# Patient Record
Sex: MALE | Race: BLACK OR AFRICAN AMERICAN | NOT HISPANIC OR LATINO | Employment: PART TIME | URBAN - METROPOLITAN AREA
[De-identification: names, ages, dates, MRNs, and addresses within clinical notes are randomized per-mention and may not be internally consistent; named-entity substitution may affect disease eponyms.]

---

## 2017-11-08 ENCOUNTER — HOSPITAL ENCOUNTER (EMERGENCY)
Facility: HOSPITAL | Age: 37
End: 2017-11-09
Attending: EMERGENCY MEDICINE | Admitting: EMERGENCY MEDICINE
Payer: COMMERCIAL

## 2017-11-08 DIAGNOSIS — IMO0002 SELF-INFLICTED INJURY: ICD-10-CM

## 2017-11-08 DIAGNOSIS — R45.1 AGITATION: Primary | ICD-10-CM

## 2017-11-08 DIAGNOSIS — R45.851 SUICIDAL IDEATIONS: ICD-10-CM

## 2017-11-08 DIAGNOSIS — S41.119A LACERATION OF ARM: ICD-10-CM

## 2017-11-08 DIAGNOSIS — F32.A DEPRESSION: ICD-10-CM

## 2017-11-08 DIAGNOSIS — R46.89 AGGRESSIVENESS: ICD-10-CM

## 2017-11-08 PROCEDURE — 82075 ASSAY OF BREATH ETHANOL: CPT | Performed by: EMERGENCY MEDICINE

## 2017-11-08 RX ORDER — HALOPERIDOL 5 MG/ML
5 INJECTION INTRAMUSCULAR ONCE
Status: COMPLETED | OUTPATIENT
Start: 2017-11-09 | End: 2017-11-09

## 2017-11-08 RX ORDER — LORAZEPAM 2 MG/ML
2 INJECTION INTRAMUSCULAR ONCE
Status: COMPLETED | OUTPATIENT
Start: 2017-11-09 | End: 2017-11-09

## 2017-11-08 RX ORDER — BENZTROPINE MESYLATE 1 MG/ML
1 INJECTION INTRAMUSCULAR; INTRAVENOUS ONCE
Status: COMPLETED | OUTPATIENT
Start: 2017-11-09 | End: 2017-11-09

## 2017-11-09 ENCOUNTER — HOSPITAL ENCOUNTER (INPATIENT)
Facility: HOSPITAL | Age: 37
LOS: 6 days | Discharge: HOME | DRG: 753 | End: 2017-11-15
Attending: PSYCHIATRY & NEUROLOGY | Admitting: PSYCHIATRY & NEUROLOGY
Payer: COMMERCIAL

## 2017-11-09 VITALS
TEMPERATURE: 98.7 F | OXYGEN SATURATION: 98 % | DIASTOLIC BLOOD PRESSURE: 62 MMHG | WEIGHT: 169.75 LBS | HEART RATE: 72 BPM | SYSTOLIC BLOOD PRESSURE: 114 MMHG | RESPIRATION RATE: 16 BRPM

## 2017-11-09 DIAGNOSIS — F31.64 BIPOLAR I DISORDER, MOST RECENT EPISODE MIXED, SEVERE WITH PSYCHOTIC FEATURES (HCC): Primary | Chronic | ICD-10-CM

## 2017-11-09 LAB
AMPHETAMINES SERPL QL SCN: NEGATIVE
BARBITURATES UR QL: NEGATIVE
BENZODIAZ UR QL: NEGATIVE
COCAINE UR QL: NEGATIVE
ETHANOL EXG-MCNC: 0.01 MG/DL
ETHANOL EXG-MCNC: 0.16 MG/DL
METHADONE UR QL: NEGATIVE
OPIATES UR QL SCN: NEGATIVE
PCP UR QL: NEGATIVE
THC UR QL: POSITIVE

## 2017-11-09 PROCEDURE — 90715 TDAP VACCINE 7 YRS/> IM: CPT | Performed by: EMERGENCY MEDICINE

## 2017-11-09 PROCEDURE — 99285 EMERGENCY DEPT VISIT HI MDM: CPT

## 2017-11-09 PROCEDURE — 80307 DRUG TEST PRSMV CHEM ANLYZR: CPT | Performed by: EMERGENCY MEDICINE

## 2017-11-09 PROCEDURE — 96372 THER/PROPH/DIAG INJ SC/IM: CPT

## 2017-11-09 PROCEDURE — 82075 ASSAY OF BREATH ETHANOL: CPT | Performed by: EMERGENCY MEDICINE

## 2017-11-09 PROCEDURE — 90471 IMMUNIZATION ADMIN: CPT

## 2017-11-09 RX ORDER — ACETAMINOPHEN 325 MG/1
650 TABLET ORAL EVERY 6 HOURS PRN
Status: DISCONTINUED | OUTPATIENT
Start: 2017-11-09 | End: 2017-11-15 | Stop reason: HOSPADM

## 2017-11-09 RX ORDER — BENZTROPINE MESYLATE 1 MG/ML
1 INJECTION INTRAMUSCULAR; INTRAVENOUS EVERY 6 HOURS PRN
Status: CANCELLED | OUTPATIENT
Start: 2017-11-09

## 2017-11-09 RX ORDER — OLANZAPINE 10 MG/1
10 TABLET ORAL
Status: DISCONTINUED | OUTPATIENT
Start: 2017-11-09 | End: 2017-11-15 | Stop reason: HOSPADM

## 2017-11-09 RX ORDER — HALOPERIDOL 5 MG
5 TABLET ORAL EVERY 8 HOURS PRN
Status: DISCONTINUED | OUTPATIENT
Start: 2017-11-09 | End: 2017-11-15 | Stop reason: HOSPADM

## 2017-11-09 RX ORDER — OLANZAPINE 10 MG/1
10 TABLET ORAL
Status: CANCELLED | OUTPATIENT
Start: 2017-11-09

## 2017-11-09 RX ORDER — HALOPERIDOL 5 MG/ML
5 INJECTION INTRAMUSCULAR EVERY 6 HOURS PRN
Status: DISCONTINUED | OUTPATIENT
Start: 2017-11-09 | End: 2017-11-15 | Stop reason: HOSPADM

## 2017-11-09 RX ORDER — MAGNESIUM HYDROXIDE/ALUMINUM HYDROXICE/SIMETHICONE 120; 1200; 1200 MG/30ML; MG/30ML; MG/30ML
30 SUSPENSION ORAL EVERY 4 HOURS PRN
Status: DISCONTINUED | OUTPATIENT
Start: 2017-11-09 | End: 2017-11-15 | Stop reason: HOSPADM

## 2017-11-09 RX ORDER — IBUPROFEN 600 MG/1
600 TABLET ORAL EVERY 8 HOURS PRN
Status: CANCELLED | OUTPATIENT
Start: 2017-11-09

## 2017-11-09 RX ORDER — BENZTROPINE MESYLATE 1 MG/ML
1 INJECTION INTRAMUSCULAR; INTRAVENOUS EVERY 6 HOURS PRN
Status: DISCONTINUED | OUTPATIENT
Start: 2017-11-09 | End: 2017-11-15 | Stop reason: HOSPADM

## 2017-11-09 RX ORDER — ZOLPIDEM TARTRATE 5 MG/1
5 TABLET ORAL
Status: CANCELLED | OUTPATIENT
Start: 2017-11-09

## 2017-11-09 RX ORDER — HYDROXYZINE HYDROCHLORIDE 25 MG/1
25 TABLET, FILM COATED ORAL EVERY 6 HOURS PRN
Status: DISCONTINUED | OUTPATIENT
Start: 2017-11-09 | End: 2017-11-15 | Stop reason: HOSPADM

## 2017-11-09 RX ORDER — HALOPERIDOL 5 MG/ML
5 INJECTION INTRAMUSCULAR EVERY 6 HOURS PRN
Status: CANCELLED | OUTPATIENT
Start: 2017-11-09

## 2017-11-09 RX ORDER — RISPERIDONE 1 MG/1
1 TABLET, ORALLY DISINTEGRATING ORAL EVERY 4 HOURS PRN
Status: DISCONTINUED | OUTPATIENT
Start: 2017-11-09 | End: 2017-11-15 | Stop reason: HOSPADM

## 2017-11-09 RX ORDER — OLANZAPINE 10 MG/1
10 INJECTION, POWDER, LYOPHILIZED, FOR SOLUTION INTRAMUSCULAR
Status: DISCONTINUED | OUTPATIENT
Start: 2017-11-09 | End: 2017-11-15 | Stop reason: HOSPADM

## 2017-11-09 RX ORDER — IBUPROFEN 600 MG/1
600 TABLET ORAL EVERY 8 HOURS PRN
Status: DISCONTINUED | OUTPATIENT
Start: 2017-11-09 | End: 2017-11-15 | Stop reason: HOSPADM

## 2017-11-09 RX ORDER — LORAZEPAM 2 MG/ML
1 INJECTION INTRAMUSCULAR EVERY 6 HOURS PRN
Status: CANCELLED | OUTPATIENT
Start: 2017-11-09

## 2017-11-09 RX ORDER — ACETAMINOPHEN 325 MG/1
650 TABLET ORAL EVERY 6 HOURS PRN
Status: CANCELLED | OUTPATIENT
Start: 2017-11-09

## 2017-11-09 RX ORDER — LORAZEPAM 1 MG/1
1 TABLET ORAL EVERY 8 HOURS PRN
Status: DISCONTINUED | OUTPATIENT
Start: 2017-11-09 | End: 2017-11-10

## 2017-11-09 RX ORDER — LORAZEPAM 1 MG/1
1 TABLET ORAL EVERY 8 HOURS PRN
Status: CANCELLED | OUTPATIENT
Start: 2017-11-09

## 2017-11-09 RX ORDER — HYDROXYZINE HYDROCHLORIDE 25 MG/1
25 TABLET, FILM COATED ORAL EVERY 6 HOURS PRN
Status: CANCELLED | OUTPATIENT
Start: 2017-11-09

## 2017-11-09 RX ORDER — MAGNESIUM HYDROXIDE/ALUMINUM HYDROXICE/SIMETHICONE 120; 1200; 1200 MG/30ML; MG/30ML; MG/30ML
30 SUSPENSION ORAL EVERY 4 HOURS PRN
Status: CANCELLED | OUTPATIENT
Start: 2017-11-09

## 2017-11-09 RX ORDER — RISPERIDONE 1 MG/1
1 TABLET, ORALLY DISINTEGRATING ORAL EVERY 4 HOURS PRN
Status: CANCELLED | OUTPATIENT
Start: 2017-11-09

## 2017-11-09 RX ORDER — LORAZEPAM 2 MG/ML
1 INJECTION INTRAMUSCULAR EVERY 6 HOURS PRN
Status: DISCONTINUED | OUTPATIENT
Start: 2017-11-09 | End: 2017-11-10

## 2017-11-09 RX ORDER — BENZTROPINE MESYLATE 1 MG/1
1 TABLET ORAL EVERY 6 HOURS PRN
Status: DISCONTINUED | OUTPATIENT
Start: 2017-11-09 | End: 2017-11-15 | Stop reason: HOSPADM

## 2017-11-09 RX ORDER — BENZTROPINE MESYLATE 0.5 MG/1
1 TABLET ORAL EVERY 6 HOURS PRN
Status: CANCELLED | OUTPATIENT
Start: 2017-11-09

## 2017-11-09 RX ORDER — ZOLPIDEM TARTRATE 5 MG/1
5 TABLET ORAL
Status: DISCONTINUED | OUTPATIENT
Start: 2017-11-09 | End: 2017-11-15 | Stop reason: HOSPADM

## 2017-11-09 RX ORDER — HALOPERIDOL 5 MG
5 TABLET ORAL EVERY 8 HOURS PRN
Status: CANCELLED | OUTPATIENT
Start: 2017-11-09

## 2017-11-09 RX ORDER — TRAMADOL HYDROCHLORIDE 50 MG/1
50 TABLET ORAL EVERY 6 HOURS PRN
Status: DISCONTINUED | OUTPATIENT
Start: 2017-11-09 | End: 2017-11-15 | Stop reason: HOSPADM

## 2017-11-09 RX ORDER — TRAMADOL HYDROCHLORIDE 50 MG/1
50 TABLET ORAL EVERY 6 HOURS PRN
Status: CANCELLED | OUTPATIENT
Start: 2017-11-09

## 2017-11-09 RX ORDER — KETAMINE HYDROCHLORIDE 50 MG/ML
150 INJECTION, SOLUTION, CONCENTRATE INTRAMUSCULAR; INTRAVENOUS ONCE
Status: COMPLETED | OUTPATIENT
Start: 2017-11-09 | End: 2017-11-09

## 2017-11-09 RX ORDER — OLANZAPINE 10 MG/1
10 INJECTION, POWDER, LYOPHILIZED, FOR SOLUTION INTRAMUSCULAR
Status: CANCELLED | OUTPATIENT
Start: 2017-11-09

## 2017-11-09 RX ADMIN — LORAZEPAM 2 MG: 2 INJECTION INTRAMUSCULAR; INTRAVENOUS at 00:03

## 2017-11-09 RX ADMIN — KETAMINE HYDROCHLORIDE 150 MG: 50 INJECTION, SOLUTION INTRAMUSCULAR; INTRAVENOUS at 00:32

## 2017-11-09 RX ADMIN — HALOPERIDOL LACTATE 5 MG: 5 INJECTION, SOLUTION INTRAMUSCULAR at 00:03

## 2017-11-09 RX ADMIN — TETANUS TOXOID, REDUCED DIPHTHERIA TOXOID AND ACELLULAR PERTUSSIS VACCINE, ADSORBED 0.5 ML: 5; 2.5; 8; 8; 2.5 SUSPENSION INTRAMUSCULAR at 00:35

## 2017-11-09 RX ADMIN — BENZTROPINE MESYLATE 1 MG: 1 INJECTION INTRAMUSCULAR; INTRAVENOUS at 00:03

## 2017-11-09 NOTE — ED NOTES
Pt thrashing around in bed after administration of medications  Pt attempting to rock stretcher and pull extremities out of restraints  Pt alternates between yelling obscenities at staff and laughing uncontrollably  Pt then stops thrashing and makes eye contact with this nurse stating "I told you  I will get out of here  I will find you and kill you  I have memorized all your faces and you will be mine  This is not a joke, so don't even think of laughing  I will kill you  Then I will kill your family, I know you have kids " Pt then continued to make death threats to other staff members       Jr Rosa RN  11/09/17 9078
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
## 2017-11-09 NOTE — ED NOTES
Security at bedside for straight cath along with Toya Marin  Patient had to be held down for catheter when he began getting irritated with insertion  Patient back to sleep once procedure was completed        Sri Avina RN  11/09/17 0384

## 2017-11-09 NOTE — ED NOTES
Patient taken out of locked restraints at this time security at bedside  Patient remains calm and cooperative  Patient informed if he becomes verbally or physically aggressive with staff  locked restrains would be reapplied  Patient verbalized understanding and asked for a blanket   Patient remains on continuous visual monitoring     Anthony Venegas RN  11/09/17 1229

## 2017-11-09 NOTE — ED NOTES
Pt screaming in room, thrashing in bed, pulling at restraints  Pt could be heard rocking the stretcher back and forth  Before staff could return room pt flipped stretcher over into wall  Dr Ruthann Montanez aware         Karen Mike, STEVEN  11/09/17 2000

## 2017-11-09 NOTE — ED NOTES
Patient awake and alert at this time  Patient is cooperative with nursing staff  Locked restraint removed from left arm and right leg at this time  Patient informed that if he becomes aggressive with staff both physically and verbally that all four restraints would be reapplied  Patient agreeable   Patient provided with urinal per request       Jamie Alvarado RN  11/09/17 5135

## 2017-11-09 NOTE — ED NOTES
Assumed care of patient at this time  Patient sleeping, respirations equal and unlabored  Appears in no apparent distress  Will continue to monitor via continuous video monitoring        Jai Montez RN  11/09/17 4566

## 2017-11-09 NOTE — ED NOTES
Pt not cooperative at this time; Dr Lazaro Cee at bedside to see pt      Monse Ramey, STEVEN  11/09/17 0013

## 2017-11-09 NOTE — ED NOTES
Patient remains calm and cooperative, meal tray provided to patient      Lila Artis RN  11/09/17 8408

## 2017-11-09 NOTE — ED NOTES
Pt uncooperative during triage and assessment of vitals  Pt refusing to change out of clothing or use breathalyzer  Pt states "I am getting the fuck out of here  I don't need any of this stuff" Explained to pt that in order for that to happen and to expedite the process an alcohol level needs to be documented  Pt then states "You have no idea who I am or what I am capable of  When I get out of here, I am going to find you and kill you " Again pt was asked to blow into BAT  Pt again states to this nurse "I don't have to do anything  I am coming after you  As soon as I get out of here, I will find you and kill you  I don't want you in my room   So leave" Dr Wesley Desai at bedside at this time     Radha Moreland, [facility]  11/09/17 8533

## 2017-11-09 NOTE — ED NOTES
Monae from [address] [place] [address] called and indicated they do not have a bed available for the patient

## 2017-11-09 NOTE — EMTALA/ACUTE CARE TRANSFER
[facility]  [address] [place] [address]  Dept: [phone]      EMTALA TRANSFER CONSENT    NAME Juan Crawford                                         1980                              MRN [id number]    I have been informed of my rights regarding examination, treatment, and transfer   by Dr Shazia Rodriguez*    Benefits: Continuity of care    Risks: Potential for delay in receiving treatment      Consent for Transfer:  I acknowledge that my medical condition has been evaluated and explained to me by the emergency department physician or other qualified medical person and/or my attending physician, who has recommended that I be transferred to the service of  Accepting Physician: Dr Bernita Kanner (Simultaneous filing  User may not have seen previous data ) at [address] [place] [address] Rd Name, [address] : [address] (Simultaneous filing  User may not have seen previous data  )  The above potential benefits of such transfer, the potential risks associated with such transfer, and the probable risks of not being transferred have been explained to me, and I fully understand them  The doctor has explained that, in my case, the benefits of transfer outweigh the risks  I agree to be transferred  I authorize the performance of emergency medical procedures and treatments upon me in both transit and upon arrival at the receiving facility  Additionally, I authorize the release of any and all medical records to the receiving facility and request they be transported with me, if possible  I understand that the safest mode of transportation during a medical emergency is an ambulance and that the Hospital advocates the use of this mode of transport   Risks of traveling to the receiving facility by car, including absence of medical control, life sustaining equipment, such as oxygen, and medical personnel has been explained to me and I fully understand them     ([facility])  [  ]  I consent to the stated transfer and to be transported by ambulance/helicopter  [  ]  I consent to the stated transfer, but refuse transportation by ambulance and accept full responsibility for my transportation by car  I understand the risks of non-ambulance transfers and I exonerate the Hospital and its staff from any deterioration in my condition that results from this refusal     X___________________________________________    DATE  17  TIME________  Signature of patient or legally responsible individual signing on patient behalf           RELATIONSHIP TO PATIENT_________________________          Provider Certification    NAME Theoplis Glenwood                                         1980                              MRN [id number]    A medical screening exam was performed on the above named patient  Based on the examination:    Condition Necessitating Transfer There were no encounter diagnoses  Patient Condition: The patient has been stabilized such that within reasonable medical probability, no material deterioration of the patient condition or the condition of the unborn child(ary) is likely to result from the transfer    Reason for Transfer: Level of Care needed not available at this facility    Transfer Requirements: [address] (Simultaneous filing  User may not have seen previous data )   · Space available and qualified personnel available for treatment as acknowledged by Naveen France [phone]  · Agreed to accept transfer and to provide appropriate medical treatment as acknowledged by       Dr Greardo Reid (Simultaneous filing   User may not have seen previous data )  · Appropriate medical records of the examination and treatment of the patient are provided at the time of transfer   [address] _______  · Transfer will be performed by qualified personnel from [facility]  and appropriate transfer equipment as required, including the use of necessary and appropriate life support measures  Provider Certification: I have examined the patient and explained the following risks and benefits of being transferred/refusing transfer to the patient/family:  General risk, such as traffic hazards, adverse weather conditions, rough terrain or turbulence, possible failure of equipment (including vehicle or aircraft), or consequences of actions of persons outside the control of the transport personnel, The patient is stable for psychiatric transfer because they are medically stable, and is protected from harming him/herself or others during transport      Based on these reasonable risks and benefits to the patient and/or the unborn child(ary), and based upon the information available at the time of the patients examination, I certify that the medical benefits reasonably to be expected from the provision of appropriate medical treatments at another medical facility outweigh the increasing risks, if any, to the individuals medical condition, and in the case of labor to the unborn child, from effecting the transfer      X____________________________________________ DATE 11/09/17        TIME_______      ORIGINAL - SEND TO MEDICAL RECORDS   COPY - SEND WITH PATIENT DURING TRANSFER

## 2017-11-09 NOTE — ED NOTES
Per Patient request, significant other Zaida Mullen to take belongings home      Carlos Lea RN  11/09/17 7116

## 2017-11-09 NOTE — ED NOTES
Pt less verbally aggressive at this time;  Pt becoming sleepy             Jose M Alvarenga RN  11/09/17 7326

## 2017-11-09 NOTE — ED NOTES
2nd attempt this morning to speak with patient  Patient offers one word answer and falls back to sleep, difficult for him to stay awake  Patient, at this time, can not hold conversation adequate to make decisions re: treatment

## 2017-11-09 NOTE — ED NOTES
Pt changed in paper scrubs at this time; security and multiple Rn's at bedside to complete this task       Trevon Hummel RN  11/09/17 2590

## 2017-11-09 NOTE — ED NOTES
Assumed care of pt at this time; pt is in restraints resting comfortable in bed; respirations relaxed and un labored       Virgilio Velez RN  11/09/17 1083

## 2017-11-09 NOTE — ED NOTES
After administration of medications patient remains uncooperative and verbally aggressive toward staff at bedside for patient safety and staff safety  Patient while sitting upright in bed continues to rock stretcher and attempt to pull extremities out of restraints  Verbally aggressive comments with obscenities interlaced become quiet and patient starts laughing in a deep tone of voice  Patient makes direct eye contact with specific nurse in room and states, "I told you  I will get out of here  I will find you and kill you  I have memorized all your faces and you will be mine  This is not a joke, so don't even think of laughing  I will kill you  Then I will kill your family, I know you have kids " After completing death threat toward nurse turned toward this nurse and stated "You too nigga, when I get out of here, I will find you and kill you " "I don't care about anyone else in this room except them, I will find them and kill them " Two RN's stepped out of room to speak with [employer] officers and to attempt to de-escalate the situation due to direct death threats made toward specific nurses             Michelle Valdez, STEVEN  11/09/17 4614

## 2017-11-09 NOTE — ED NOTES
Pt looked around room at staff members and stated "When you go home to your cars, to your families, I will be there"      Kaye Archibald RN  11/09/17 0755

## 2017-11-10 PROBLEM — F39 MOOD DISORDER (HCC): Status: ACTIVE | Noted: 2017-11-10

## 2017-11-10 PROBLEM — F10.239 ALCOHOL DEPENDENCE WITH WITHDRAWAL (HCC): Status: ACTIVE | Noted: 2017-11-10

## 2017-11-10 LAB
ALBUMIN SERPL BCP-MCNC: 3.4 G/DL (ref 3.5–5)
ALP SERPL-CCNC: 72 U/L (ref 46–116)
ALT SERPL W P-5'-P-CCNC: 29 U/L (ref 12–78)
ANION GAP SERPL CALCULATED.3IONS-SCNC: 7 MMOL/L (ref 4–13)
AST SERPL W P-5'-P-CCNC: 45 U/L (ref 5–45)
BASOPHILS # BLD AUTO: 0.03 THOUSANDS/ΜL (ref 0–0.1)
BASOPHILS NFR BLD AUTO: 1 % (ref 0–1)
BILIRUB SERPL-MCNC: 1.53 MG/DL (ref 0.2–1)
BUN SERPL-MCNC: 16 MG/DL (ref 5–25)
CALCIUM SERPL-MCNC: 8.8 MG/DL (ref 8.3–10.1)
CHLORIDE SERPL-SCNC: 105 MMOL/L (ref 100–108)
CHOLEST SERPL-MCNC: 198 MG/DL (ref 50–200)
CO2 SERPL-SCNC: 27 MMOL/L (ref 21–32)
CREAT SERPL-MCNC: 1.08 MG/DL (ref 0.6–1.3)
EOSINOPHIL # BLD AUTO: 0.22 THOUSAND/ΜL (ref 0–0.61)
EOSINOPHIL NFR BLD AUTO: 4 % (ref 0–6)
ERYTHROCYTE [DISTWIDTH] IN BLOOD BY AUTOMATED COUNT: 12.8 % (ref 11.6–15.1)
GFR SERPL CREATININE-BSD FRML MDRD: 102 ML/MIN/1.73SQ M
GLUCOSE P FAST SERPL-MCNC: 76 MG/DL (ref 65–99)
GLUCOSE SERPL-MCNC: 76 MG/DL (ref 65–140)
HCT VFR BLD AUTO: 43.4 % (ref 36.5–49.3)
HDLC SERPL-MCNC: 90 MG/DL (ref 40–60)
HGB BLD-MCNC: 14.6 G/DL (ref 12–17)
LDLC SERPL CALC-MCNC: 81 MG/DL (ref 0–100)
LYMPHOCYTES # BLD AUTO: 2.02 THOUSANDS/ΜL (ref 0.6–4.47)
LYMPHOCYTES NFR BLD AUTO: 36 % (ref 14–44)
MAGNESIUM SERPL-MCNC: 2.3 MG/DL (ref 1.6–2.6)
MCH RBC QN AUTO: 27.3 PG (ref 26.8–34.3)
MCHC RBC AUTO-ENTMCNC: 33.6 G/DL (ref 31.4–37.4)
MCV RBC AUTO: 81 FL (ref 82–98)
MONOCYTES # BLD AUTO: 0.58 THOUSAND/ΜL (ref 0.17–1.22)
MONOCYTES NFR BLD AUTO: 10 % (ref 4–12)
NEUTROPHILS # BLD AUTO: 2.8 THOUSANDS/ΜL (ref 1.85–7.62)
NEUTS SEG NFR BLD AUTO: 49 % (ref 43–75)
NRBC BLD AUTO-RTO: 0 /100 WBCS
PLATELET # BLD AUTO: 158 THOUSANDS/UL (ref 149–390)
PMV BLD AUTO: 12.6 FL (ref 8.9–12.7)
POTASSIUM SERPL-SCNC: 3.7 MMOL/L (ref 3.5–5.3)
PROT SERPL-MCNC: 7.1 G/DL (ref 6.4–8.2)
RBC # BLD AUTO: 5.34 MILLION/UL (ref 3.88–5.62)
SODIUM SERPL-SCNC: 139 MMOL/L (ref 136–145)
TRIGL SERPL-MCNC: 136 MG/DL
TSH SERPL DL<=0.05 MIU/L-ACNC: 0.8 UIU/ML (ref 0.36–3.74)
WBC # BLD AUTO: 5.65 THOUSAND/UL (ref 4.31–10.16)

## 2017-11-10 PROCEDURE — 80053 COMPREHEN METABOLIC PANEL: CPT | Performed by: PSYCHIATRY & NEUROLOGY

## 2017-11-10 PROCEDURE — 83735 ASSAY OF MAGNESIUM: CPT | Performed by: PSYCHIATRY & NEUROLOGY

## 2017-11-10 PROCEDURE — 80061 LIPID PANEL: CPT | Performed by: PSYCHIATRY & NEUROLOGY

## 2017-11-10 PROCEDURE — 85025 COMPLETE CBC W/AUTO DIFF WBC: CPT | Performed by: PSYCHIATRY & NEUROLOGY

## 2017-11-10 PROCEDURE — 86592 SYPHILIS TEST NON-TREP QUAL: CPT | Performed by: PSYCHIATRY & NEUROLOGY

## 2017-11-10 PROCEDURE — 84443 ASSAY THYROID STIM HORMONE: CPT | Performed by: PSYCHIATRY & NEUROLOGY

## 2017-11-10 RX ORDER — LORAZEPAM 2 MG/ML
2 INJECTION INTRAMUSCULAR EVERY 6 HOURS PRN
Status: DISCONTINUED | OUTPATIENT
Start: 2017-11-10 | End: 2017-11-15 | Stop reason: HOSPADM

## 2017-11-10 RX ORDER — GABAPENTIN 400 MG/1
400 CAPSULE ORAL 3 TIMES DAILY
Status: DISCONTINUED | OUTPATIENT
Start: 2017-11-10 | End: 2017-11-15 | Stop reason: HOSPADM

## 2017-11-10 RX ORDER — LORAZEPAM 1 MG/1
2 TABLET ORAL EVERY 8 HOURS PRN
Status: DISCONTINUED | OUTPATIENT
Start: 2017-11-10 | End: 2017-11-15 | Stop reason: HOSPADM

## 2017-11-10 RX ADMIN — GABAPENTIN 400 MG: 400 CAPSULE ORAL at 21:36

## 2017-11-10 RX ADMIN — Medication 1 TABLET: at 13:56

## 2017-11-10 RX ADMIN — GABAPENTIN 400 MG: 400 CAPSULE ORAL at 10:57

## 2017-11-10 RX ADMIN — GABAPENTIN 400 MG: 400 CAPSULE ORAL at 17:00
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
## 2017-11-10 NOTE — DISCHARGE INSTR - OTHER ORDERS
[address]  [phone]    Walk in hours are at the above address on Monday, Tuesday, and Thursday 9-11:30am; Wednesday from 9-11:30am and 1-3pm     [facility]  [address]  [phone]    Please go to your appointment on Thursday, 11/16/17, at 2pm with your treatment team       [facility]  [address] [facility] [address]  [place], [address] [facility]    Please go to the above address if any medical concerns or conditions arise  Need Help Now? If you or someone you care about is having a problem with drugs and/or alcohol, there is help:     There is a [place] [facility] Office that you can call 8:00 a m  till 4:00 p m  to 15 [phone] or email Dora@google com  org, 24 hours a day, that can help walk you through your options for getting help  If you do not have health insurance and are in financial need, this office             may also be able help you in accessing funding for services   If you have health insurance, including medical assistance, there should be a phone number on your insurance card that you can all to find out how to access services  The card may say, For Behavioral Services or For Drug and Alcohol Services or For Substance Abuse Services call the number provided   If it is after regular hours or holiday and it is a medical emergency, you should seek help at your local hospital emergency room  The [facility] and Alcohol Office will help work with the hospitals to access treatment upon discharge   Local support groups, such as Alcohol Anonymous, Narcotic Anonymous, Al-Anon (for family members) exist in many locations and can be accessed free of charge and without any membership commitment  You can learn about what meetings exist in your area email at https://[employer]/  aspx or calling the listing in your local phonebook  These meetings also in the community section of the local newspaper   If you still need help, you can call the [place] Department of Drug and Alcohol Programs at [phone]
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
## 2017-11-10 NOTE — PROGRESS NOTES
Patient visible in the milieu  No agitation  Does appear paranoid/suspicious  Denies AH but does have an inappropriate smile during interaction  States that the reason he cut himself was in a suicide attempt but denies SI currently  Compliant with medications  Will continue to monitor

## 2017-11-10 NOTE — CASE MANAGEMENT
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  Reports history of depression for the past three years, IP in 2014 for psychiatric reasons in [place], [place], does not remember the name of the hospital  Lives with wife, Delaney Lam; his 3year old lives with the child's mother  States he felt suicidal yesterday and did cut self, with no intent to die present  Denies recent or current HI at this time  Denies psychosis, yet does seem preoccupied, possibly responding to internal stimuli  Patient signed treatment plan and ROIs for wife, PCP, and OP mental health treatment  Agrees to see outpatient therapist and psychiatrist upon discharge  CM will continue to monitor
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
## 2017-11-11 RX ADMIN — Medication 1 TABLET: at 08:34

## 2017-11-11 RX ADMIN — GABAPENTIN 400 MG: 400 CAPSULE ORAL at 08:34

## 2017-11-11 RX ADMIN — GABAPENTIN 400 MG: 400 CAPSULE ORAL at 15:42

## 2017-11-11 RX ADMIN — GABAPENTIN 400 MG: 400 CAPSULE ORAL at 21:03

## 2017-11-11 NOTE — TREATMENT PLAN
TREATMENT PLAN REVIEW - [id number] [facility], [place] Funk 40 y o  1980 male MRN: [id number]    [address] [facility] Room / Bed: Brian Portillo [facility] Encounter: [id number]          Admit Date/Time:  11/9/2017  7:46 PM    Treatment Team: Attending Provider: Lexi Hilario MD; : Stephie Brown; Patient Care Technician: Gregg Francisco; Patient Care Technician: Laura Sagastume;  Registered Nurse: Sunday Hansen, RN; Patient Care Technician: Julia Daley; Registered Nurse: Oz Owens RN    Diagnosis: Principal Problem:    Mood disorder [facility])  Active Problems:    Alcohol dependence with withdrawal [facility])    Patient Strengths: negotiates basic needs, patient is on a voluntary commitment, stable/recent employment, supportive family, work skills     Patient Barriers: difficulty adapting, lack of social/family support, limited education, limited family ties, limited motivation, patient is unwilling to work on problems, poor insight, poor interpersonal skills, substance abuse    Short Term Goals: decrease in depressive symptoms, decrease in manic symptoms, decrease in anxiety symptoms, decrease in suicidal thoughts, decrease in level of agitation, control of withdrawal symptoms, ability to stay safe on the unit, ability to stay free from restraints    Long Term Goals: improvement in depression, improvement in anxiety, resolution of manic symptoms, stabilization of mood, free of suicidal thoughts, no self abusive behavior, resolution of withdrawal symptoms, improved reality testing, improved reasoning ability, improved impulse control, improved insight    Progress Towards Goals: starting psychitric medications as prescribed, starting alcohol detoxification protocol    Recommended Treatment: medication management, patient medication education, group therapy, milieu therapy, continued Behavioral Health psychiatric evaluation/assessment process Treatment Frequency: daily medication monitoring, group and milieu therapy daily, monitoring through interdisciplinary rounds, monitoring through weekly patient care conferences    Expected Discharge Date:  3-4 days    Discharge Plan: attend AA meetings, referral for outpatient drug and alcohol counseling, referral for outpatient medication management with a psychiatrist, referral for outpatient psychotherapy, referrals as indicated, return to previous living arrangement    Treatment Plan Created/Updated By: Leandro Lorenzo MD

## 2017-11-11 NOTE — PROGRESS NOTES
C/O" I am funny , having good time, think about work "    Report from staff regarding this patient received and record reviewed  prior to seeing this patient   Behavior over the last 24 hours:    Sleep:lNot too good  Appetite:Ok  Medication side effects:denied  ROS:still elevated  mood and  Elevated mood   Mental Status Evaluation:  Appearance:  Dressed appropraitely   Behavior:  cooperative   Speech:  pressured   Mood:  elevated   Affect:  appropriate  b   Thought Process:  disorganed   Thought Content:  grandosed   Perceptual Disturbances: Denied AV hallucination   Risk Potential: NO REFUGIO    Sensorium:  normal   Cognition:  intact   Consciousness:  Alert, OX3   Attention: Fair   Insight:  poor   Judgment: poor   Gait/Station: With in normal range   Motor Activity: With in normal range     Progress Toward Goals: working on current treatment goals, no changes  Made in treatment plan   Recommended Treatment: Continue with group therapy, milieu therapy and occupational therapy  Risks, benefits and possible side effects of Medications:   Risks, benefits, and possible side effects of medications explained to patient and patient verbalizes understanding        Medications:   current meds:   Current Facility-Administered Medications   Medication Dose Route Frequency    acetaminophen (TYLENOL) tablet 650 mg  650 mg Oral Q6H PRN    aluminum-magnesium hydroxide-simethicone (MYLANTA) 200-200-20 mg/5 mL oral suspension 30 mL  30 mL Oral Q4H PRN    benztropine (COGENTIN) injection 1 mg  1 mg Intramuscular Q6H PRN    benztropine (COGENTIN) tablet 1 mg  1 mg Oral Q6H PRN    gabapentin (NEURONTIN) capsule 400 mg  400 mg Oral TID    haloperidol (HALDOL) tablet 5 mg  5 mg Oral Q8H PRN    haloperidol lactate (HALDOL) injection 5 mg  5 mg Intramuscular Q6H PRN    hydrOXYzine HCL (ATARAX) tablet 25 mg  25 mg Oral Q6H PRN    ibuprofen (MOTRIN) tablet 600 mg  600 mg Oral Q8H PRN    LORazepam (ATIVAN) 2 mg/mL injection 2 mg  2 mg Intramuscular Q6H PRN    LORazepam (ATIVAN) tablet 2 mg  2 mg Oral Q8H PRN    magnesium hydroxide (MILK OF MAGNESIA) 400 mg/5 mL oral suspension 30 mL  30 mL Oral Daily PRN    multivitamin-minerals (CENTRUM) tablet 1 tablet  1 tablet Oral Daily    nicotine polacrilex (NICORETTE) gum 2 mg  2 mg Oral Q2H PRN    OLANZapine (ZyPREXA) IM injection 10 mg  10 mg Intramuscular Q3H PRN    OLANZapine (ZyPREXA) tablet 10 mg  10 mg Oral Q3H PRN    risperiDONE (RisperDAL M-TABS) dispersible tablet 1 mg  1 mg Oral Q4H PRN    traMADol (ULTRAM) tablet 50 mg  50 mg Oral Q6H PRN    zolpidem (AMBIEN) tablet 5 mg  5 mg Oral HS PRN     Labs: NA    Assessment, Diagnosis  and Plan: continue with current meds and goals, F/U tomorrow    Counseling / Coordination of Care  Total floor / unit time spent today20 minutes  minutes  Greater than 50% of total time was spent with the patient and / or family counseling and / or coordination of care  A description of the counseling / coordination of care:      Dre Brian MD

## 2017-11-11 NOTE — PROGRESS NOTES
Patients wife asked to speak to this nurse with patient  She wanted to know how the patient was doing and if he could go home tomorrow  She was told that patient has been very cooperative and participating in his care  She was upset because she feels he should be able to go home  She was told that he may go home early next week as patients are generally not discharged over the weekend  Patient said he understood and was ok with this
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
## 2017-11-11 NOTE — PLAN OF CARE
Problem: SELF HARM/SUICIDALITY  Goal: Will have no self-injury during hospital stay  INTERVENTIONS:  - Q 15 MINUTES: Routine safety checks  - Q WAKING SHIFT & PRN: Assess risk to determine if routine checks are adequate to maintain patient safety  - Encourage patient to participate actively in care by formulating a plan to combat response to suicidal ideation, identify supports and resources   Outcome: Progressing      Problem: DEPRESSION  Goal: Will be euthymic at discharge  INTERVENTIONS:  - Administer medication as ordered  - Provide emotional support via 1:1 interaction with staff  - Encourage involvement in milieu/groups/activities  - Monitor for social isolation   Outcome: Progressing      Problem: PSYCHOSIS  Goal: Will report no hallucinations or delusions  Interventions:  - Administer medication as  ordered  - Every waking shifts and PRN assess for the presence of hallucinations and or delusions  - Assist with reality testing to support increasing orientation  - Assess if patient's hallucinations or delusions are encouraging self-harm or harm to others and intervene as appropriate   Outcome: Progressing      Problem: ANXIETY  Goal: Will report anxiety at manageable levels  INTERVENTIONS:  - Administer medication as ordered  - Teach and encourage coping skills  - Provide emotional support  - Assess patient/family for anxiety and ability to cope   Outcome: Not Progressing    Goal: By discharge: Patient will verbalize 2 strategies to deal with anxiety  Interventions:  - Identify any obvious source/trigger to anxiety  - Staff will assist patient in applying identified coping technique/skills  - Encourage attendance of scheduled groups and activities   Outcome: Not Progressing      Problem: SUBSTANCE USE/ABUSE  Goal: Will have no detox symptoms and will verbalize plan for changing substance-related behavior  INTERVENTIONS:  - Monitor physical status and assess for symptoms of withdrawal  - Administer medication as ordered  - Provide emotional support with 1 on 1 interaction with staff  - Encourage recovery focused program/ addiction education  - Assess for verbalization of changing behaviors related to substance abuse  - Initiate consults and referrals as appropriate (Case Management, Spiritual Care, etc )   Outcome: Progressing    Goal: By discharge, will develop insight into their chemical dependency and sustain motivation to continue in recovery  INTERVENTIONS:  - Attends all daily group sessions and scheduled AA groups  - Actively practices coping skills through participation in the therapeutic community and adherence to program rules  - Reviews and completes assignments from individual treatment plan  - Assist patient development of understanding of their personal cycle of addiction and relapse triggers   Outcome: Not Progressing    Goal: By discharge, patient will have ongoing treatment plan addressing chemical dependency  INTERVENTIONS:  - Assist patient with resources and/or appointments for ongoing recovery based living   Outcome: Progressing      Problem: DISCHARGE PLANNING  Goal: Discharge to home or other facility with appropriate resources  INTERVENTIONS:  - Identify barriers to discharge w/patient and caregiver  - Arrange for needed discharge resources and transportation as appropriate  - Identify discharge learning needs (meds, wound care, etc )  - Arrange for interpretive services to assist at discharge as needed  - Refer to Case Management Department for coordinating discharge planning if the patient needs post-hospital services based on physician/advanced practitioner order or complex needs related to functional status, cognitive ability, or social support system   Outcome: Progressing      Problem: Ineffective Coping  Goal: Cooperates with admission process  Interventions:   - Complete admission process   Outcome: Progressing    Goal: Identifies ineffective coping skills  Outcome: Progressing    Goal: Patient/Family participate in treatment and DC plans  Interventions:  - Provide therapeutic environment   Outcome: Progressing

## 2017-11-12 RX ORDER — RISPERIDONE 0.25 MG/1
0.5 TABLET, FILM COATED ORAL 2 TIMES DAILY
Status: DISCONTINUED | OUTPATIENT
Start: 2017-11-12 | End: 2017-11-13

## 2017-11-12 RX ORDER — DIVALPROEX SODIUM 500 MG/1
500 TABLET, EXTENDED RELEASE ORAL
Status: DISCONTINUED | OUTPATIENT
Start: 2017-11-12 | End: 2017-11-13

## 2017-11-12 RX ADMIN — GABAPENTIN 400 MG: 400 CAPSULE ORAL at 09:02

## 2017-11-12 RX ADMIN — RISPERIDONE 0.5 MG: 0.25 TABLET ORAL at 11:54

## 2017-11-12 RX ADMIN — RISPERIDONE 0.5 MG: 0.25 TABLET ORAL at 17:10

## 2017-11-12 RX ADMIN — GABAPENTIN 400 MG: 400 CAPSULE ORAL at 17:10

## 2017-11-12 RX ADMIN — DIVALPROEX SODIUM 500 MG: 500 TABLET, EXTENDED RELEASE ORAL at 21:17

## 2017-11-12 RX ADMIN — Medication 1 TABLET: at 09:02

## 2017-11-12 RX ADMIN — GABAPENTIN 400 MG: 400 CAPSULE ORAL at 21:17
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
## 2017-11-12 NOTE — PLAN OF CARE
Problem: SELF HARM/SUICIDALITY  Goal: Will have no self-injury during hospital stay  INTERVENTIONS:  - Q 15 MINUTES: Routine safety checks  - Q WAKING SHIFT & PRN: Assess risk to determine if routine checks are adequate to maintain patient safety  - Encourage patient to participate actively in care by formulating a plan to combat response to suicidal ideation, identify supports and resources   Outcome: Progressing      Problem: DEPRESSION  Goal: Will be euthymic at discharge  INTERVENTIONS:  - Administer medication as ordered  - Provide emotional support via 1:1 interaction with staff  - Encourage involvement in milieu/groups/activities  - Monitor for social isolation   Outcome: Progressing      Problem: PSYCHOSIS  Goal: Will report no hallucinations or delusions  Interventions:  - Administer medication as  ordered  - Every waking shifts and PRN assess for the presence of hallucinations and or delusions  - Assist with reality testing to support increasing orientation  - Assess if patient's hallucinations or delusions are encouraging self-harm or harm to others and intervene as appropriate   Outcome: Progressing      Problem: ANXIETY  Goal: Will report anxiety at manageable levels  INTERVENTIONS:  - Administer medication as ordered  - Teach and encourage coping skills  - Provide emotional support  - Assess patient/family for anxiety and ability to cope   Outcome: Progressing    Goal: By discharge: Patient will verbalize 2 strategies to deal with anxiety  Interventions:  - Identify any obvious source/trigger to anxiety  - Staff will assist patient in applying identified coping technique/skills  - Encourage attendance of scheduled groups and activities   Outcome: Progressing      Problem: SUBSTANCE USE/ABUSE  Goal: Will have no detox symptoms and will verbalize plan for changing substance-related behavior  INTERVENTIONS:  - Monitor physical status and assess for symptoms of withdrawal  - Administer medication as ordered  - Provide emotional support with 1 on 1 interaction with staff  - Encourage recovery focused program/ addiction education  - Assess for verbalization of changing behaviors related to substance abuse  - Initiate consults and referrals as appropriate (Case Management, Spiritual Care, etc )   Outcome: Progressing    Goal: By discharge, will develop insight into their chemical dependency and sustain motivation to continue in recovery  INTERVENTIONS:  - Attends all daily group sessions and scheduled AA groups  - Actively practices coping skills through participation in the therapeutic community and adherence to program rules  - Reviews and completes assignments from individual treatment plan  - Assist patient development of understanding of their personal cycle of addiction and relapse triggers   Outcome: Progressing    Goal: By discharge, patient will have ongoing treatment plan addressing chemical dependency  INTERVENTIONS:  - Assist patient with resources and/or appointments for ongoing recovery based living   Outcome: Progressing      Problem: DISCHARGE PLANNING  Goal: Discharge to home or other facility with appropriate resources  INTERVENTIONS:  - Identify barriers to discharge w/patient and caregiver  - Arrange for needed discharge resources and transportation as appropriate  - Identify discharge learning needs (meds, wound care, etc )  - Arrange for interpretive services to assist at discharge as needed  - Refer to Case Management Department for coordinating discharge planning if the patient needs post-hospital services based on physician/advanced practitioner order or complex needs related to functional status, cognitive ability, or social support system   Outcome: Progressing      Problem: Ineffective Coping  Goal: Cooperates with admission process  Interventions:   - Complete admission process   Outcome: Progressing    Goal: Identifies ineffective coping skills  Outcome: Progressing    Goal: Patient/Family participate in treatment and DC plans  Interventions:  - Provide therapeutic environment   Outcome: Progressing

## 2017-11-12 NOTE — PROGRESS NOTES
Patient was very cooperative this am   We talked about his visit with his wife yesterday and he said it went tell and that she was coming in to visit him today again    Less pressured and smiling today    No si or hi

## 2017-11-13 PROBLEM — F31.64 BIPOLAR I DISORDER, MOST RECENT EPISODE MIXED, SEVERE WITH PSYCHOTIC FEATURES (HCC): Chronic | Status: ACTIVE | Noted: 2017-11-10

## 2017-11-13 LAB — RPR SER QL: NORMAL

## 2017-11-13 RX ORDER — DIVALPROEX SODIUM 500 MG/1
1000 TABLET, EXTENDED RELEASE ORAL
Status: DISCONTINUED | OUTPATIENT
Start: 2017-11-13 | End: 2017-11-15 | Stop reason: HOSPADM

## 2017-11-13 RX ORDER — RISPERIDONE 1 MG/1
1 TABLET, FILM COATED ORAL 2 TIMES DAILY
Status: DISCONTINUED | OUTPATIENT
Start: 2017-11-13 | End: 2017-11-14

## 2017-11-13 RX ADMIN — GABAPENTIN 400 MG: 400 CAPSULE ORAL at 17:57

## 2017-11-13 RX ADMIN — RISPERIDONE 0.5 MG: 0.25 TABLET ORAL at 08:23

## 2017-11-13 RX ADMIN — LORAZEPAM 2 MG: 1 TABLET ORAL at 13:35

## 2017-11-13 RX ADMIN — RISPERIDONE 1 MG: 1 TABLET ORAL at 17:59

## 2017-11-13 RX ADMIN — DIVALPROEX SODIUM 1000 MG: 500 TABLET, FILM COATED, EXTENDED RELEASE ORAL at 21:25

## 2017-11-13 RX ADMIN — GABAPENTIN 400 MG: 400 CAPSULE ORAL at 08:23

## 2017-11-13 RX ADMIN — Medication 1 TABLET: at 08:24

## 2017-11-13 RX ADMIN — GABAPENTIN 400 MG: 400 CAPSULE ORAL at 21:26

## 2017-11-13 NOTE — CASE MANAGEMENT
CM met with patient  Patient upset due to confusion with discharge, as he believes he was told it was today for date  CM reviewed goals of inpatient treatment with patient, medication management goals, and goals of discharge  Patient remained calm and seemed to understand the need for each of these  Patient remains dysthymic due to wishing to help his significant other, who is sick at home  He is taking responsibility for his actions and sees the need for continued treatment upon discharge  This CM spoke with significant other, who is requesting discharge be as soon as possible due to her medical condition  Will call her, with patient, later today, to update
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
## 2017-11-13 NOTE — PLAN OF CARE
Problem: Ineffective Coping  Goal: Participates in unit activities  Outcome: Progressing  Participated in Nursing Ed and Late Afternoon Life skills [employer]

## 2017-11-13 NOTE — PLAN OF CARE
Problem: Ineffective Coping  Goal: Cooperates with admission process  Interventions:   - Complete admission process   Outcome: Completed Date Met: 11/13/17

## 2017-11-13 NOTE — PROGRESS NOTES
Patient very social out in milieu  Has been cooperative and had not been inappropriate this evening   Denies SI

## 2017-11-13 NOTE — PROGRESS NOTES
Patient upset; crying and agitated after finding out that he will not be discharged today  States that on Friday the dr told him that if he had a good weekend he would be discharged today  Patient feels that the dr lied to him  Reported that his wife is dying and he needs to be there for her   Patient then went on to tell RN that [facility] will [facility]

## 2017-11-14 LAB
BILIRUB UR QL STRIP: NEGATIVE
CLARITY UR: ABNORMAL
COLOR UR: YELLOW
GLUCOSE UR STRIP-MCNC: NEGATIVE MG/DL
HGB UR QL STRIP.AUTO: NEGATIVE
KETONES UR STRIP-MCNC: ABNORMAL MG/DL
LEUKOCYTE ESTERASE UR QL STRIP: NEGATIVE
NITRITE UR QL STRIP: NEGATIVE
PH UR STRIP.AUTO: 7.5 [PH] (ref 4.5–8)
PROT UR STRIP-MCNC: NEGATIVE MG/DL
SP GR UR STRIP.AUTO: 1.03 (ref 1–1.03)
UROBILINOGEN UR QL STRIP.AUTO: 0.2 E.U./DL

## 2017-11-14 PROCEDURE — 81003 URINALYSIS AUTO W/O SCOPE: CPT | Performed by: PSYCHIATRY & NEUROLOGY

## 2017-11-14 RX ORDER — RISPERIDONE 1 MG/1
2 TABLET, FILM COATED ORAL EVERY EVENING
Status: DISCONTINUED | OUTPATIENT
Start: 2017-11-14 | End: 2017-11-15 | Stop reason: HOSPADM

## 2017-11-14 RX ORDER — RISPERIDONE 1 MG/1
1 TABLET, FILM COATED ORAL DAILY
Status: DISCONTINUED | OUTPATIENT
Start: 2017-11-15 | End: 2017-11-15 | Stop reason: HOSPADM

## 2017-11-14 RX ADMIN — Medication 1 TABLET: at 08:28

## 2017-11-14 RX ADMIN — RISPERIDONE 1 MG: 1 TABLET ORAL at 08:28

## 2017-11-14 RX ADMIN — GABAPENTIN 400 MG: 400 CAPSULE ORAL at 18:38

## 2017-11-14 RX ADMIN — GABAPENTIN 400 MG: 400 CAPSULE ORAL at 21:36

## 2017-11-14 RX ADMIN — DIVALPROEX SODIUM 1000 MG: 500 TABLET, FILM COATED, EXTENDED RELEASE ORAL at 21:35

## 2017-11-14 RX ADMIN — GABAPENTIN 400 MG: 400 CAPSULE ORAL at 08:28

## 2017-11-14 RX ADMIN — RISPERIDONE 2 MG: 1 TABLET ORAL at 18:38

## 2017-11-14 NOTE — PROGRESS NOTES
Patient about the unit  Pleasant and cooperative  Compliant with treatment and is hopeful for discharge tomorrow

## 2017-11-14 NOTE — DISCHARGE SUMMARY
Discharge Summary - [address] [facility] 40 y o  male MRN: [id number]  Unit/Bed#: LS2 876-42 Encounter: [id number]        Admission Date: 11/9/2017         Discharge Date: 11/15/2017    Attending Psychiatrist:  Jayde De Santiago MD    Reason for Admission/HPI:   History of Present Illness     Ramakrishna Sagastume is a 38y/o male with h/o a prior psychiatric hospitalization, who was brought to the [facility] ER 41/7/5080 by police after being found by a relative attempting suicide by cutting his wrists  In ER Pt admitted to such, also to being high due to having drank ETOH  He was very agitated, made numerous homicidal threats in general and to staff, extending the threats to staff's families  Restraints were required at one point, along with one prn dose each, of Haldol, Ativan and Cogentin  At some points he would pretend to be affected by the medicine then thrash about  Ketamine 150mg IM was then given  After Pt calmed, restraints were removed  In ER Pt's ETOH breath test: 0 156 and urine drug screen + for THC  After medical clearance Pt was transferred to the [facility] psychiatric unit at the [facility] on a 201 voluntary commitment basis        As per Dr Tapan Conley initial psychiatric note:    ' [Chief Complaint: "I thought I was losing my mind", "I don't want to go on living like this", depression, suicidal ideation, suicide attempt, suicidal gesture, self-abusive behavior and violent behavior     Ramakrishna Sagastume is a 40 y o  male with a history of depression versus bipolar disorder, alcohol use and personality disorder who was admitted to the inpatient psychiatric unit on a voluntary 201 commitment basis due to depression, anxiety, mixed symptoms of bipolar disorder, aggressive behavior and signs and symptoms of alcohol abuse       Symptoms prior to admission included worsening depression, depression, feeling depressed, suicidal behavior, hopelessness, helplessness, poor concentration, anger outbursts, difficulty controlling anger, agitation, alcohol abuse and difficulty attending to activities of daily living  Onset of symptoms was abrupt starting few days ago with rapidly worsening course since that time  Stressors preceding admission included alcohol use problems and job stress       On initial evaluation after admission to the inpatient psychiatric unit the patient was tense and guarded  The patient provided limited information about himself, but he stated that he became more recently depressed and he started to thinking about cutting himself or even superficially cut himself because he believes that his wife and his children would would be better off without him  He did not specify any stressors but he stated he started to drink more heavily  He endorsed symptoms of alcohol abuse was loss of control and mood instability associated was alcohol  He described blackouts but no seizures or DTs  He stated that he has been depressed for a while and he depression is not always associated was alcohol drinking  He denied previous suicidal attempt  He has problems with authorities, and often get angry when someone tried to be boss of him  He had some retirement time and does not afraid of retirement, does not afraid of fight  He does not initiate fight but respond to unjustice  Denied history of seizure Denied any significant head trauma  When we talked about his past psychiatric history the patient was a rather poor historian  He denied suicidal attempts     Denied symptoms of psychosis     Before admission to inpatient psych unit the patient was angry and violent and threatening to staff in the emergency room  He explained this to the writer that he did not like to be told what to do and he did not like to being confined placed that lead to his severe anxiety    During his evaluation in the unit the patient was not angry, not aggressive, had relatively good eye contact and had no threatening behavior  However he was rather tense and guarded] [facility]: On the unit, superficial cuts were observed on his Lt arm by staff and he admitted to auditory hallucinations to medical staff but denied it to the psychiatrist   He at first denied prior psychiatric hx to the psychiatrist, but then later admitted to other staff about h/o depression for at least three years, with a 2014 psychiatric admission in [place]  Neurontin 400mg tid was started to treat potential acute alcohol withdrawal and anxiety symptoms  Zolpidem 5mg qhs was added prn insomnia---which Pt never used  He was guarded and could be agitated at times,  He once cursed staff member because he did not like the food but then apologized  Due to hyper and intrusive behavior, he was started on Depakote 500mg qhs and Risperdal 1mg bid  He was perseverative on discharge citing the need to get home to his wife whom he said was gravely ill and his job  When he could not go home the day he anticipated, he became very angry, threatening, upset and crying and security had to be called to de-escalate him  He was spoken to and given Lorazepam which were successful in calming him down  Depakote ER was titrated to 1000mg qhs and Risperdal titrated to 3mg total per day  There were no physical outbursts for the length of his stay and he denied SI or HI on the unit  He was generally cooperative on the unit and medication compliant  There were no complaints of withdrawal or ETOH cravings during his stay  He was visible and social in the milieu  With medication adjustments Pt's mood improved and he demonstrated self control  On day of discharge, Pt reported feeling "Regular, ready to go home "  He denied depression, anxiety, SI, HI, psychotic Sxs or ETOH cravings  He refuses addiction Tx for ETOH use  He requested Nicorette gum for smoking cessation  Note:  Labwork reviewed and this AM:  ANC 1 56,  VPA 49, and CMP WNL    Pt voicing no medical complaints  Mental Status Evaluation:  Appearance:  Dressed, clean, good eye contact   Behavior:  Calm, cooperative, pleasant   Speech:  Clear, normal rate and volume   Mood:  Euthymic   Affect:  Appropriately broad   Thought Process:  Organized, Goal directed   Associations: intact associations   Thought Content:  No verbalized delusions   Perceptual Disturbances: Pt denies any hallucinations or paranoia and does not appear to be responding to internal stimuli   Risk Potential: Pt presently denies SI or HI    Sensorium:  Person, Place, Day of the week, Month, Year   Memory:  short term memory grossly intact   Consciousness:  alert, awake   Attention: Good   Insight:  Fair   Judgment: Fair   Gait/Station: Normal gait/station   Motor Activity: No abnormal movements     Vitals:    11/14/17 1543   BP: 132/70   Pulse: 79   Resp: 16   Temp: 97 9 °F (36 6 °C)       Discharge Diagnosis:   Bipolar I Disorder, most recent episode mixed, severe with psychotic features  Alcohol dependence with withdrawal         Discharge Medications:  See after visit summary for reconciled discharge medications provided to patient and family  Discharge instructions/Information to patient and family:   See after visit summary for information provided to patient and family  Provisions for Follow-Up Care:  See after visit summary for information related to follow-up care and any pertinent home health orders  Discharge Statement   I spent 45 minutes discharging the patient  This time was spent on the day of discharge  I had direct contact with the patient on the day of discharge  Discussed discharge plan including that Pt must repeat ANC and Divalproex level tomorrow (Rx for these were given)  Divalproex and Risperidone are culprits in this--his first drop in ANC--and we will trend the value  Pt set up for psychiatric intake at [facility] tomorrow    Team will fax them instructions that this labwork is being trended

## 2017-11-14 NOTE — PROGRESS NOTES
Progress Note - Behavioral Health   Claudette Mcnair 40 y o  male MRN: [id number]  Unit/Bed#: SY0 752-75 Encounter: [id number]    Abdulkadir was seen for continuing care and reviewed with treatment team   Pt was pleasant through interview, reported "I'm alright" and denied any mood, anxiety, or psychotic Sxs  He  verbalized his desire for discharge and accepted when I told him the team would need to observe him for one more day  Floor team relayed that he has been calm and cooperative on the unit  Per team, his wife has already arranged for an intake appt for psychiatrist and psychotherapist at [facility] in 2 days  No aggressive outbursts reported  Sleep: Good  Appetite: Good  Medication side effects:  None per Pt  ROS: No complaints    Labs/Tests:  Reviewed    Mental Status Evaluation:     Appearance:  Dressed, Fair hygiene, Fair eye contact   Behavior:  Calm, cooperative, pleasant, with a guarded edge   Speech:  Clear, normal rate and volume   Mood:  Euthymic   Affect:  Appropriately broad   Thought Process:  Organized, Goal directed   Associations: intact associations   Thought Content:  No verbalized delusions   Perceptual Disturbances: Pt denies any hallucinations or paranoia and does not appear to be responding to internal stimuli   Risk Potential: Pt presently denies SI or HI    Sensorium:  Person, Place, Day of the week, Month, Year   Memory:  short term memory grossly intact   Consciousness:  alert, awake   Attention: Good   Insight:  Improving   Judgment: Improving   Gait/Station: Normal gait/station   Motor Activity: No abnormal movements     Vitals:    11/14/17 1543   BP: 132/70   Pulse: 79   Resp: 16   Temp: 97 9 °F (36 6 °C)       Progress Toward Goals:  Gradual progress is being made  Risperdal hs dose increased to 2mg and he will continue 1mg qAM    If Pt remains stable and in control through this night, will discharge tomorrow      Repeat VPA level, CBC and CMP tomorrow    Assessment/Plan   Principal Problem:    Bipolar I disorder, most recent episode mixed, severe with psychotic features ([facility] )  Active Problems:    Al[facility] dependence with withdrawal ([facility] )      Recommended Treatment: Continue with pharmacotherapy, group therapy, milieu therapy and occupational therapy  The patient will be maintained on the following medications:    divalproex sodium 1,000 mg Oral HS   gabapentin 400 mg Oral TID   multivitamin-minerals 1 tablet Oral Daily   [START ON 11/15/2017] risperiDONE 1 mg Oral Daily   risperiDONE 2 mg Oral QPM       Risks, benefits and possible side effects of Medications:   Risks, benefits, and possible side effects of medications explained to patient and patient verbalizes understanding

## 2017-11-14 NOTE — CASE MANAGEMENT
CM met with patient  Patient was calm, cooperative, and appreciative that he may be leaving tomorrow  States that he is able to take a bus if he is medically cleared tomorrow to go home  Patient denies SI, HI, danger to self or others, denies psychosis  CM will continue to monitor

## 2017-11-14 NOTE — DISCHARGE INSTRUCTIONS
Bipolar Disorder   WHAT YOU NEED TO KNOW:   Bipolar disorder is a long-term chemical imbalance that causes rapid changes in mood and behavior  High moods are called suhas  Low moods are called depression  Sometimes you will feel manic and sometimes you will feel depressed  You can have alternating episodes of suhas and depression  This is called a mixed bipolar state  DISCHARGE INSTRUCTIONS:   Call 911 if:   · You think about hurting yourself or someone else  Contact your healthcare provider or psychiatrist if:   · You are having trouble managing your bipolar disorder  · You cannot sleep, or are sleeping all the time  · You cannot eat, or are eating more than usual     · You feel dizzy or your stomach is upset  · You cannot make it to your next meeting  · You have questions or concerns about your condition or care  Medicines:   · Medicines  may be given to help keep your mood stable, or to help you sleep  Changes in medicine are often needed as your bipolar disorder changes  · Take your medicine as directed  Contact your healthcare provider if you think your medicine is not helping or if you have side effects  Tell him or her if you are allergic to any medicine  Keep a list of the medicines, vitamins, and herbs you take  Include the amounts, and when and why you take them  Bring the list or the pill bottles to follow-up visits  Carry your medicine list with you in case of an emergency  Follow up with your healthcare provider or psychiatrist as directed:  Write down your questions so you remember to ask them during your visits  Manage bipolar disorder:  Watch for triggers of bipolar disorder symptoms, such as stress  Learn new ways to relax, such as deep breathing, to manage your stress  Tell someone if you feel a manic or depressive period might be coming on  Ask a friend or family member to help watch you for bipolar symptoms   Work to develop skills that will help you manage bipolar disorder  You may need to make lifestyle changes  Ask your healthcare provider or psychiatrist for resources  For support and more information:   · [address] [facility]), [address] [facility], [address] [facility], [address]  [place] [address]   Phone: [phone]  Phone: [phone]  Web Address: Aimee tn  · Depression and [address] (DBSA)  [address] [place] [address], [facility] , [address]  Phone: [phone]  Web Address: "[employer]" no  org   © 2017 [address] [facility] Information is for End User's use only and may not be sold, redistributed or otherwise used for commercial purposes  All illustrations and images included in CareNotes® are the copyrighted property of A D A M , Inc  or [address]  The above information is an  only  It is not intended as medical advice for individual conditions or treatments  Talk to your doctor, nurse or pharmacist before following any medical regimen to see if it is safe and effective for you  Abuse of Alcohol   WHAT YOU NEED TO KNOW:   · Alcohol abuse is unhealthy drinking behavior  You may drink too much at one time once a week, or continue to drink too much daily  You continue to drink even though it causes problems  The problems can be alcohol related legal problems, or problems with work or relationships with family  · If you drink too much at one time, you are binge drinking  Binge drinking is when you have a large amount of alcohol in a short time  Your blood alcohol concentrations (YOSI) goes above 0 08 g/dLlevel during binge drinking  For men, this usually happens with more than 4 drinks in 2 hours  For women, it is more than 3 drinks in 2 hours  A drink is 12 ounces of beer, 4 ounces of wine, or 1½ ounces of liquor    DISCHARGE INSTRUCTIONS:   Call 911 for the following:   · You have sudden chest pain or trouble breathing  · You have a seizure or have shaking or trembling  · You were in an accident because of alcohol  Seek care immediately if:   · You want to harm yourself or others  · You have hallucinations (you see or hear things that are not real)  · You cannot stop vomiting or you vomit blood  Contact your healthcare provider if:   · You need help to stop drinking alcohol  · You have questions or concerns about your condition or care  Medicines:   · Vitamin supplements  may be given to treat low vitamin levels  Alcohol can make it hard for your body to absorb enough vitamins such as B1  Vitamin supplements may also be given to prevent alcohol related brain damage  · Take your medicine as directed  Contact your healthcare provider if you think your medicine is not helping or if you have side effects  Tell him or her if you are allergic to any medicine  Keep a list of the medicines, vitamins, and herbs you take  Include the amounts, and when and why you take them  Bring the list or the pill bottles to follow-up visits  Carry your medicine list with you in case of an emergency  Treatments or therapies you may need:   · Detoxification (detox) and withdrawal  is a program that helps you to safely get alcohol out of your body  Detox can also help get rid of the physical need to drink  Healthcare providers monitor the physical symptoms of withdrawal  They may give you medicines to help decrease nausea, dehydration, and seizures  Healthcare providers will also monitor your blood pressure, heart and breathing rates, and your temperature  Symptoms of anxiety, depression, and suicidal thoughts are also monitored and managed during detox  Healthcare providers may give you medicines for these symptoms and therapy sessions will be available to you   Detox is usually done at a detox center or in a hospital  Healthcare providers do not recommend that you try to detox at home or by yourself  Withdrawal symptoms may become life-threatening  The center can help you find 12 step programs or an individual therapist to help with emotional support after detox  · Inpatient and outpatient treatment  focus on your personal needs to help you stop drinking  Treatment helps you understand the reasons you abuse alcohol  Counselors and therapists provide you with support and help you find ways to cope instead of drinking  You may need inpatient treatment to provide a controlled environment  You may need outpatient treatment after your inpatient treatment is complete  · Alcohol aversion therapy  takes away the desire to drink by causing a negative reaction when you drink  Healthcare providers may give you medicines that cause nausea and vomiting when you drink alcohol  They may instead give you a medicine that decreases your urge to drink alcohol  These medicines are used to help you stop drinking or reduce the amount you drink  They can also help you avoid relapse  Follow up with your healthcare provider as directed:  Write down your questions so you remember to ask them during your visits  Avoid alcohol:  You should stop drinking entirely  Alcohol can damage your brain, heart, and liver  It also increases your risk for injury, high blood pressure, and certain types of cancer  Alcohol is dangerous when you combine it with certain medicines  Do not drive if you drink alcohol:  Make sure someone who has not been drinking can help you get home  Get support:  Most people need support to stop drinking alcohol  Mental health providers, support groups, rehabilitation centers, and your healthcare provider can provide support     For more information:   · Alcoholics Anonymous  Web Address: http://www cardenas info/  · Substance Abuse and [facility] [address] [place] [phone]  Web Address: https://Qwiqq/  © 2017 [address] Blake Marques Information is for End User's use only and may not be sold, redistributed or otherwise used for commercial purposes  All illustrations and images included in CareNotes® are the copyrighted property of A D A M , Inc  or Cy Danielle  The above information is an  only  It is not intended as medical advice for individual conditions or treatments  Talk to your doctor, nurse or pharmacist before following any medical regimen to see if it is safe and effective for you  Divalproex (By mouth)   Divalproex Sodium (dye-CARMEN-proe-ex BRADEN-sidney-um)  Treats seizures  Also treats bipolar disorder and helps prevent migraine headaches  Brand Name(s): Depakote, Depakote ER, Depakote Sprinkles   There may be other brand names for this medicine  When This Medicine Should Not Be Used: This medicine is not right for everyone  Do not use it if you had an allergic reaction to divalproex, valproate sodium, valproic acid, if you are pregnant, or if you have certain genetic disorders (such as urea cycle disorder or mitochondrial disorders)  How to Use This Medicine:   Delayed Release Capsule, Delayed Release Tablet, Coated Tablet, Long Acting Tablet  · Take your medicine as directed  Your dose may need to be changed several times to find what works best for you  · You may take this medicine with food to decrease stomach upset  · Capsule, tablet, or extended-release tablet: Swallow the medicine whole  Do not crush, break, or chew it  · Sprinkle capsule: You may open the capsule and pour the medicine into a small amount of soft food such as pudding or applesauce  Stir this mixture well and swallow it without chewing  · This medicine should come with a Medication Guide  Ask your pharmacist for a copy if you do not have one  · Missed dose: Take a dose as soon as you remember  If it is almost time for your next dose, wait until then and take a regular dose  Do not take extra medicine to make up for a missed dose   If you miss 2 or more doses, call your doctor  · Store the medicine in a closed container at room temperature, away from heat, moisture, and direct light  Drugs and Foods to Avoid:   Ask your doctor or pharmacist before using any other medicine, including over-the-counter medicines, vitamins, and herbal products  · Some medicines can affect how divalproex sodium works  Tell your doctor if you are using any of the following:   ¨ Amitriptyline, aspirin, clonazepam, diazepam, nortriptyline, propofol, rifampin, ritonavir, rufinamide, tolbutamide, or zidovudine  ¨ Birth control pill  ¨ Blood thinner (including warfarin)  ¨ Carbapenem antibiotic (including ertapenem, imipenem, meropenem)  ¨ Other seizure medicines (including carbamazepine, ethosuximide, felbamate, lamotrigine, phenobarbital, phenytoin, primidone, topiramate)  · Alcohol, narcotic pain relievers, or sleeping pills may cause you to feel more lightheaded, dizzy, or faint when used with this medicine  Warnings While Using This Medicine:   · It is not safe to take this medicine during pregnancy  It could harm an unborn baby  Tell your doctor right away if you become pregnant  · Tell your doctor if you are breastfeeding, or if you have kidney disease, liver disease, a blood disease, or pancreas problems, or a history of depression or mental health problems  · This medicine may cause the following problems:  ¨ Liver problems  ¨ Pancreatitis  ¨ Hyperammonemic encephalopathy (too much ammonia in your blood)  ¨ Depression or thoughts of suicide  ¨ Thrombocytopenia (decrease in blood cells that affect clotting)  ¨ Hypothermia (low body temperature)  ¨ Drug reaction with eosinophilia and systemic symptoms (DRESS), which may damage organs such as the liver, kidney, or heart  · This medicine may make you dizzy or drowsy  Do not drive or do anything that could be dangerous until you know how this medicine affects you  · Do not stop using this medicine suddenly   Your doctor will need to slowly decrease your dose before you stop it completely  · Tell any doctor or dentist who treats you that you are using this medicine  This medicine may affect certain medical test results  · Your doctor will check your progress and the effects of this medicine at regular visits  Keep all appointments  · Keep all medicine out of the reach of children  Never share your medicine with anyone  Possible Side Effects While Using This Medicine:   Call your doctor right away if you notice any of these side effects:  · Allergic reaction: Itching or hives, swelling in your face or hands, swelling or tingling in your mouth or throat, chest tightness, trouble breathing  · Blistering, peeling, red skin rash  · Confusion, problems with memory, unusual drowsiness, clumsiness  · Dark urine or pale stools, loss of appetite, stomach pain, yellow skin or eyes  · Fever, rash, swollen glands in the neck, armpit, or groin  · Sudden and severe stomach pain, nausea, vomiting, lightheadedness  · Thoughts of hurting yourself, depression, unusual changes in behavior or moods  · Unusual bleeding, bruising, or weakness  If you notice these less serious side effects, talk with your doctor:   · Diarrhea, stomach upset  · Hair loss  · Tiredness, sleepiness  · Trouble sleeping, tremor  · Vision changes, dizziness, headache  If you notice other side effects that you think are caused by this medicine, tell your doctor  Call your doctor for medical advice about side effects  You may report side effects to FDA at 0-245-FDA-4484  © 2017 26[address] Blake Marques Information is for End User's use only and may not be sold, redistributed or otherwise used for commercial purposes  The above information is an  only  It is not intended as medical advice for individual conditions or treatments   Talk to your doctor, nurse or pharmacist before following any medical regimen to see if it is safe and effective for you       Risperidone (By mouth)   Risperidone (ris-PER-i-done)  Treats schizophrenia, bipolar disorder, and irritability caused by autistic disorder  Brand Name(s): RisperDAL, RisperDAL M-Tab, risperiDONE M-Tab   There may be other brand names for this medicine  When This Medicine Should Not Be Used: This medicine is not right for everyone  Do not use it if you had an allergic reaction to risperidone or paliperidone  How to Use This Medicine:   Liquid, Tablet, Dissolving Tablet  · Take your medicine as directed  Your dose may need to be changed several times to find what works best for you  · Measure the oral liquid medicine with a marked measuring spoon, oral syringe, or medicine cup  You may mix your dose with water, coffee, low-fat milk, or orange juice  Do not mix it with cola or tea  · Make sure your hands are dry before you handle the disintegrating tablet  Peel back the foil from the blister pack, then remove the tablet  Do not push the tablet through the foil  Place the tablet in your mouth  After it has melted, swallow or take a drink of water  · Missed dose: Take a dose as soon as you remember  If it is almost time for your next dose, wait until then and take a regular dose  Do not take extra medicine to make up for a missed dose  · Store the medicine in a closed container at room temperature, away from heat, moisture, and direct light  Do not freeze the oral liquid  Drugs and Foods to Avoid:   Ask your doctor or pharmacist before using any other medicine, including over-the-counter medicines, vitamins, and herbal products  · Some medicines can affect how risperidone works  Tell your doctor if you are using carbamazepine, clozapine, fluoxetine, furosemide, levodopa, paroxetine, phenobarbital, phenytoin, quinidine, rifampin, or blood pressure medicine  · Do not drink alcohol while you are using this medicine  · Tell your doctor if you use anything else that makes you sleepy   Some examples are allergy medicine, narcotic pain medicine, and alcohol  Warnings While Using This Medicine:   · Tell your doctor if you are pregnant or breastfeeding, or if you have kidney disease, liver disease, diabetes, high cholesterol, Parkinson disease, trouble swallowing, or a history of breast cancer or seizures  Tell your doctor if you have heart failure, low blood pressure, or a history of a heart attack or stroke  · This medicine may cause the following problems:  ¨ Increased risk of stroke  ¨ Neuroleptic malignant syndrome (a nerve disorder that could be life-threatening)  ¨ Tardive dyskinesia (a muscle disorder that could become permanent)  ¨ High blood sugar levels or high cholesterol levels  ¨ Increased levels of prolactin hormone  · This medicine may make you dizzy, drowsy, or have trouble with thinking or controlling body movements, which may lead to falls, fractures or other injuries  Do not drive or do anything else that could be dangerous until you know how this medicine affects you  · This medicine may change how your body regulates temperature  Avoid activities that could cause you to become very cold, hot, or dehydrated  · This medicine may make you bleed, bruise, or get infections more easily  Take precautions to prevent illness and injury  Wash your hands often  · Risperdal® M-Tab® contains aspartame (phenylalanine)  If you have phenylketonuria (PKU), talk to your doctor before using this medicine  · Your doctor will do lab tests at regular visits to check on the effects of this medicine  Keep all appointments  · Keep all medicine out of the reach of children  Never share your medicine with anyone    Possible Side Effects While Using This Medicine:   Call your doctor right away if you notice any of these side effects:  · Allergic reaction: Itching or hives, swelling in your face or hands, swelling or tingling in your mouth or throat, chest tightness, trouble breathing  · Fast, slow, pounding, or uneven heartbeat  · Fever, sweating, confusion, or muscle stiffness  · Increased hunger or thirst, change in how much or how often you urinate  · Jerky muscle movements you cannot control (often in your face, tongue, or jaw)  · Lightheadedness, dizziness, or fainting  · Numbness or weakness on one side of your body, sudden or severe headache, problems with vision, speech, or walking  · Painful, prolonged erection  · Seizures or tremors  · Swelling of the breasts, breast soreness, nipple discharge (in both women and men)  · Trouble swallowing  If you notice these less serious side effects, talk with your doctor:   · Constipation, diarrhea, nausea, or upset stomach  · Drooling  · Drowsiness or trouble sleeping  · Weight gain  If you notice other side effects that you think are caused by this medicine, tell your doctor  Call your doctor for medical advice about side effects  You may report side effects to FDA at 1-161-FDA-7748  © 2017 [address] Blake  Information is for End User's use only and may not be sold, redistributed or otherwise used for commercial purposes  The above information is an  only  It is not intended as medical advice for individual conditions or treatments  Talk to your doctor, nurse or pharmacist before following any medical regimen to see if it is safe and effective for you  Gabapentin (By mouth)   Gabapentin (atilio-a-PEN-tin)  Treats seizures and pain caused by shingles  Brand Name(s): ACTIVE-PAC with Gabapentin, Convenience Rakan, Cyclo/Tere 10/300 Pack, FusePaq Fanatrex, Tere-V, Gralise, [address] Pack, Neurontin, SmartRx Tere Kit   There may be other brand names for this medicine  When This Medicine Should Not Be Used: This medicine is not right for everyone  Do not use it if you had an allergic reaction to gabapentin  How to Use This Medicine:   Capsule, Liquid, Tablet  · Take your medicine as directed   Your dose may need to be changed several times to find what works best for you  If you have epilepsy, do not allow more than 12 hours to pass between doses  · Capsule: Swallow the capsule whole with plenty of water  Do not open, crush, or chew it  · Gralise® tablet: Swallow the tablet whole   Do not crush, break, or chew it  · Neurontin® tablet: If you break a tablet into 2 pieces, use the second half as your next dose  If you don't use it within 28 days, throw it away  · Measure the oral liquid medicine with a marked measuring spoon, oral syringe, or medicine cup  · This medicine should come with a Medication Guide  Ask your pharmacist for a copy if you do not have one  · Missed dose: Take a dose as soon as you remember  If it is almost time for your next dose, wait until then and take a regular dose  Do not take extra medicine to make up for a missed dose  · Store the medicine in a closed container at room temperature, away from heat, moisture, and direct light  Store the Neurontin® oral liquid in the refrigerator  Do not freeze  Drugs and Foods to Avoid:   Ask your doctor or pharmacist before using any other medicine, including over-the-counter medicines, vitamins, and herbal products  · Some medicines can affect how gabapentin works  Tell your doctor if you also use any of the following:   ¨ Hydrocodone  ¨ Morphine  · If you take an antacid, wait at least 2 hours before you take gabapentin  · Tell your doctor if you use anything else that makes you sleepy  Some examples are allergy medicine, narcotic pain medicine, and alcohol  Warnings While Using This Medicine:   · Tell your doctor if you are pregnant or breastfeeding, or if you have kidney problems or are receiving dialysis  Tell your doctor if you have a history of depression or mental health problems  · This medicine may increase depression or thoughts of suicide  Tell your doctor right away if you start to feel more depressed or think about hurting yourself    · This medicine may cause a serious medicine, tell your doctor  Call your doctor for medical advice about side effects  You may report side effects to FDA at 9-924-FDA-3236  © 2017 26[address] Blake Marques Information is for End User's use only and may not be sold, redistributed or otherwise used for commercial purposes  The above information is an  only  It is not intended as medical advice for individual conditions or treatments  Talk to your doctor, nurse or pharmacist before following any medical regimen to see if it is safe and effective for you

## 2017-11-14 NOTE — PROGRESS NOTES
PT has been calm and cooperative  PT denies any s/s  Pt asked to listen to radio at this time  Rn spoke to pt's wife and stated that she already scheduled an intake appointment for a therapist and psychiatrist at [facility] on Thursday @ 2 pm  PT's wife informed that it would be passed on

## 2017-11-15 VITALS
SYSTOLIC BLOOD PRESSURE: 123 MMHG | BODY MASS INDEX: 22.4 KG/M2 | HEIGHT: 73 IN | WEIGHT: 169 LBS | DIASTOLIC BLOOD PRESSURE: 58 MMHG | HEART RATE: 99 BPM | TEMPERATURE: 98.4 F | RESPIRATION RATE: 16 BRPM

## 2017-11-15 LAB
ALBUMIN SERPL BCP-MCNC: 3.2 G/DL (ref 3.5–5)
ALP SERPL-CCNC: 64 U/L (ref 46–116)
ALT SERPL W P-5'-P-CCNC: 25 U/L (ref 12–78)
ANION GAP SERPL CALCULATED.3IONS-SCNC: 5 MMOL/L (ref 4–13)
AST SERPL W P-5'-P-CCNC: 14 U/L (ref 5–45)
BASOPHILS # BLD AUTO: 0.03 THOUSANDS/ΜL (ref 0–0.1)
BASOPHILS NFR BLD AUTO: 1 % (ref 0–1)
BILIRUB SERPL-MCNC: 0.47 MG/DL (ref 0.2–1)
BUN SERPL-MCNC: 13 MG/DL (ref 5–25)
CALCIUM SERPL-MCNC: 8.8 MG/DL (ref 8.3–10.1)
CHLORIDE SERPL-SCNC: 104 MMOL/L (ref 100–108)
CO2 SERPL-SCNC: 30 MMOL/L (ref 21–32)
CREAT SERPL-MCNC: 0.99 MG/DL (ref 0.6–1.3)
EOSINOPHIL # BLD AUTO: 0.3 THOUSAND/ΜL (ref 0–0.61)
EOSINOPHIL NFR BLD AUTO: 6 % (ref 0–6)
ERYTHROCYTE [DISTWIDTH] IN BLOOD BY AUTOMATED COUNT: 12.5 % (ref 11.6–15.1)
GFR SERPL CREATININE-BSD FRML MDRD: 113 ML/MIN/1.73SQ M
GLUCOSE P FAST SERPL-MCNC: 80 MG/DL (ref 65–99)
GLUCOSE SERPL-MCNC: 80 MG/DL (ref 65–140)
HCT VFR BLD AUTO: 42.9 % (ref 36.5–49.3)
HGB BLD-MCNC: 14.2 G/DL (ref 12–17)
LYMPHOCYTES # BLD AUTO: 2.64 THOUSANDS/ΜL (ref 0.6–4.47)
LYMPHOCYTES NFR BLD AUTO: 53 % (ref 14–44)
MCH RBC QN AUTO: 27 PG (ref 26.8–34.3)
MCHC RBC AUTO-ENTMCNC: 33.1 G/DL (ref 31.4–37.4)
MCV RBC AUTO: 82 FL (ref 82–98)
MONOCYTES # BLD AUTO: 0.44 THOUSAND/ΜL (ref 0.17–1.22)
MONOCYTES NFR BLD AUTO: 9 % (ref 4–12)
NEUTROPHILS # BLD AUTO: 1.56 THOUSANDS/ΜL (ref 1.85–7.62)
NEUTS SEG NFR BLD AUTO: 31 % (ref 43–75)
NRBC BLD AUTO-RTO: 0 /100 WBCS
PLATELET # BLD AUTO: 149 THOUSANDS/UL (ref 149–390)
PMV BLD AUTO: 12.5 FL (ref 8.9–12.7)
POTASSIUM SERPL-SCNC: 4.2 MMOL/L (ref 3.5–5.3)
PROT SERPL-MCNC: 6.9 G/DL (ref 6.4–8.2)
RBC # BLD AUTO: 5.25 MILLION/UL (ref 3.88–5.62)
SODIUM SERPL-SCNC: 139 MMOL/L (ref 136–145)
VALPROATE SERPL-MCNC: 49 UG/ML (ref 50–100)
WBC # BLD AUTO: 4.98 THOUSAND/UL (ref 4.31–10.16)

## 2017-11-15 PROCEDURE — 80164 ASSAY DIPROPYLACETIC ACD TOT: CPT | Performed by: PSYCHIATRY & NEUROLOGY

## 2017-11-15 PROCEDURE — 85025 COMPLETE CBC W/AUTO DIFF WBC: CPT | Performed by: PSYCHIATRY & NEUROLOGY

## 2017-11-15 PROCEDURE — 80053 COMPREHEN METABOLIC PANEL: CPT | Performed by: PSYCHIATRY & NEUROLOGY

## 2017-11-15 RX ORDER — RISPERIDONE 1 MG/1
1 TABLET, FILM COATED ORAL DAILY
Qty: 30 TABLET | Refills: 0 | Status: SHIPPED | OUTPATIENT
Start: 2017-11-16 | End: 2017-12-16

## 2017-11-15 RX ORDER — RISPERIDONE 2 MG/1
2 TABLET, FILM COATED ORAL EVERY EVENING
Qty: 30 TABLET | Refills: 0 | Status: SHIPPED | OUTPATIENT
Start: 2017-11-15 | End: 2017-12-15

## 2017-11-15 RX ORDER — DIVALPROEX SODIUM 500 MG/1
1000 TABLET, EXTENDED RELEASE ORAL
Qty: 60 TABLET | Refills: 0 | Status: SHIPPED | OUTPATIENT
Start: 2017-11-15 | End: 2017-12-15

## 2017-11-15 RX ORDER — GABAPENTIN 400 MG/1
400 CAPSULE ORAL 3 TIMES DAILY
Qty: 90 CAPSULE | Refills: 0 | Status: SHIPPED | OUTPATIENT
Start: 2017-11-15 | End: 2017-12-15

## 2017-11-15 RX ADMIN — Medication 1 TABLET: at 08:20

## 2017-11-15 RX ADMIN — GABAPENTIN 400 MG: 400 CAPSULE ORAL at 08:20

## 2017-11-15 RX ADMIN — RISPERIDONE 1 MG: 1 TABLET ORAL at 08:22

## 2017-11-15 NOTE — CASE MANAGEMENT
CM met with patient  Patient was calm, cooperative, and agreeable to obtain lab work before OP appointment tomorrow at follow up provider  Denies suicidal ideation, homicidal ideation, and psychosis; feels safe to return to home environment  Spoke with significant other and she is euthymic for return home; will meet patient at hospital for 12 noon discharge

## 2017-11-15 NOTE — DISCHARGE INSTR - LAB
Contact Information: If you have any questions, concerns, pended studies, tests and/or procedures, or emergencies regarding your inpatient behavioral health visit  Please contact [facility] [phone] and ask to speak to a , nurse or physician  A contact is available 24 hours/ 7 days a week at this number  Summary of Procedures Performed During your Stay:  Below is a list of major procedures performed during your hospital stay and a summary of results:  - No major procedures performed  Pending Studies     Start     Ordered    11/15/17 0000  CBC and differential     Comments: This is a patient instruction: This test is non-fasting  Please drink two glasses of water morning of bloodwork  Please get bloodwork done in the early morning  11/15/17 1049    11/15/17 0000  Valproic acid level, total     Comments:  Please get labwork done in the early morning  Result to be faxed to [facility]:   [phone]      11/15/17 1049        If studies are pending at discharge, follow up with your PCP and/or referring provider

## 2017-11-15 NOTE — PLAN OF CARE
Problem: SELF HARM/SUICIDALITY  Goal: Will have no self-injury during hospital stay  INTERVENTIONS:  - Q 15 MINUTES: Routine safety checks  - Q WAKING SHIFT & PRN: Assess risk to determine if routine checks are adequate to maintain patient safety  - Encourage patient to participate actively in care by formulating a plan to combat response to suicidal ideation, identify supports and resources   Outcome: Progressing      Problem: DEPRESSION  Goal: Will be euthymic at discharge  INTERVENTIONS:  - Administer medication as ordered  - Provide emotional support via 1:1 interaction with staff  - Encourage involvement in milieu/groups/activities  - Monitor for social isolation   Outcome: Progressing      Problem: PSYCHOSIS  Goal: Will report no hallucinations or delusions  Interventions:  - Administer medication as  ordered  - Every waking shifts and PRN assess for the presence of hallucinations and or delusions  - Assist with reality testing to support increasing orientation  - Assess if patient's hallucinations or delusions are encouraging self-harm or harm to others and intervene as appropriate   Outcome: Progressing      Problem: ANXIETY  Goal: Will report anxiety at manageable levels  INTERVENTIONS:  - Administer medication as ordered  - Teach and encourage coping skills  - Provide emotional support  - Assess patient/family for anxiety and ability to cope   Outcome: Progressing    Goal: By discharge: Patient will verbalize 2 strategies to deal with anxiety  Interventions:  - Identify any obvious source/trigger to anxiety  - Staff will assist patient in applying identified coping technique/skills  - Encourage attendance of scheduled groups and activities   Outcome: Progressing      Problem: SUBSTANCE USE/ABUSE  Goal: Will have no detox symptoms and will verbalize plan for changing substance-related behavior  INTERVENTIONS:  - Monitor physical status and assess for symptoms of withdrawal  - Administer medication as ordered  - Provide emotional support with 1 on 1 interaction with staff  - Encourage recovery focused program/ addiction education  - Assess for verbalization of changing behaviors related to substance abuse  - Initiate consults and referrals as appropriate (Case Management, Spiritual Care, etc )   Outcome: Progressing    Goal: By discharge, will develop insight into their chemical dependency and sustain motivation to continue in recovery  INTERVENTIONS:  - Attends all daily group sessions and scheduled AA groups  - Actively practices coping skills through participation in the therapeutic community and adherence to program rules  - Reviews and completes assignments from individual treatment plan  - Assist patient development of understanding of their personal cycle of addiction and relapse triggers   Outcome: Progressing    Goal: By discharge, patient will have ongoing treatment plan addressing chemical dependency  INTERVENTIONS:  - Assist patient with resources and/or appointments for ongoing recovery based living   Outcome: Progressing      Problem: DISCHARGE PLANNING  Goal: Discharge to home or other facility with appropriate resources  INTERVENTIONS:  - Identify barriers to discharge w/patient and caregiver  - Arrange for needed discharge resources and transportation as appropriate  - Identify discharge learning needs (meds, wound care, etc )  - Arrange for interpretive services to assist at discharge as needed  - Refer to Case Management Department for coordinating discharge planning if the patient needs post-hospital services based on physician/advanced practitioner order or complex needs related to functional status, cognitive ability, or social support system   Outcome: Progressing      Problem: Ineffective Coping  Goal: Identifies ineffective coping skills  Outcome: Progressing    Goal: Patient/Family participate in treatment and DC plans  Interventions:  - Provide therapeutic environment   Outcome: Progressing

## 2017-11-15 NOTE — PLAN OF CARE
Problem: SELF HARM/SUICIDALITY  Goal: Will have no self-injury during hospital stay  INTERVENTIONS:  - Q 15 MINUTES: Routine safety checks  - Q WAKING SHIFT & PRN: Assess risk to determine if routine checks are adequate to maintain patient safety  - Encourage patient to participate actively in care by formulating a plan to combat response to suicidal ideation, identify supports and resources   Outcome: Completed Date Met: 11/15/17      Problem: DEPRESSION  Goal: Will be euthymic at discharge  INTERVENTIONS:  - Administer medication as ordered  - Provide emotional support via 1:1 interaction with staff  - Encourage involvement in milieu/groups/activities  - Monitor for social isolation   Outcome: Completed Date Met: 11/15/17      Problem: PSYCHOSIS  Goal: Will report no hallucinations or delusions  Interventions:  - Administer medication as  ordered  - Every waking shifts and PRN assess for the presence of hallucinations and or delusions  - Assist with reality testing to support increasing orientation  - Assess if patient's hallucinations or delusions are encouraging self-harm or harm to others and intervene as appropriate   Outcome: Completed Date Met: 11/15/17      Problem: ANXIETY  Goal: Will report anxiety at manageable levels  INTERVENTIONS:  - Administer medication as ordered  - Teach and encourage coping skills  - Provide emotional support  - Assess patient/family for anxiety and ability to cope   Outcome: Completed Date Met: 11/15/17    Goal: By discharge: Patient will verbalize 2 strategies to deal with anxiety  Interventions:  - Identify any obvious source/trigger to anxiety  - Staff will assist patient in applying identified coping technique/skills  - Encourage attendance of scheduled groups and activities   Outcome: Completed Date Met: 11/15/17      Problem: SUBSTANCE USE/ABUSE  Goal: Will have no detox symptoms and will verbalize plan for changing substance-related behavior  INTERVENTIONS:  - Monitor physical status and assess for symptoms of withdrawal  - Administer medication as ordered  - Provide emotional support with 1 on 1 interaction with staff  - Encourage recovery focused program/ addiction education  - Assess for verbalization of changing behaviors related to substance abuse  - Initiate consults and referrals as appropriate (Case Management, Spiritual Care, etc )   Outcome: Completed Date Met: 11/15/17    Goal: By discharge, will develop insight into their chemical dependency and sustain motivation to continue in recovery  INTERVENTIONS:  - Attends all daily group sessions and scheduled AA groups  - Actively practices coping skills through participation in the therapeutic community and adherence to program rules  - Reviews and completes assignments from individual treatment plan  - Assist patient development of understanding of their personal cycle of addiction and relapse triggers   Outcome: Completed Date Met: 11/15/17    Goal: By discharge, patient will have ongoing treatment plan addressing chemical dependency  INTERVENTIONS:  - Assist patient with resources and/or appointments for ongoing recovery based living   Outcome: Completed Date Met: 11/15/17      Problem: DISCHARGE PLANNING  Goal: Discharge to home or other facility with appropriate resources  INTERVENTIONS:  - Identify barriers to discharge w/patient and caregiver  - Arrange for needed discharge resources and transportation as appropriate  - Identify discharge learning needs (meds, wound care, etc )  - Arrange for interpretive services to assist at discharge as needed  - Refer to Case Management Department for coordinating discharge planning if the patient needs post-hospital services based on physician/advanced practitioner order or complex needs related to functional status, cognitive ability, or social support system   Outcome: Completed Date Met: 11/15/17      Problem: Ineffective Coping  Goal: Identifies ineffective coping skills  Outcome: Completed Date Met: 11/15/17    Goal: Patient/Family participate in treatment and DC plans  Interventions:  - Provide therapeutic environment   Outcome: Completed Date Met: 11/15/17

## 2020-11-13 NOTE — PROGRESS NOTES
C/O" I get very happy, like funny, just liek dancing when happy "    Report from staff regarding this patient received and record reviewed  prior to seeing this patient   Behavior over the last 24 hours:  , he is hyper, intrusive and somewhat sexually inappropriate, dancing ij front of female patinet who was going out from room into [address]   Sleep:4  Appetite:ok  Medication side effects:none  ROS: mood disorder, on no mood stabilizer at present  Mental Status Evaluation:  Appearance:  Dressed appropraitely   Behavior:  cooperative   Speech:  normal   Mood:  happy   Affect:  appropriate     Thought Process:  Flight of ideas    Thought Content:  normal   Perceptual Disturbances: Denied AV hallucination   Risk Potential: NO REFUGIO    Sensorium:  normal   Cognition:  intact   Consciousness:  Alert, OX3   Attention: Fair   Insight:  poor   Judgment: poor   Gait/Station: With in normal range   Motor Activity: With in normal range     Progress Toward Goals: working on current treatment goals, no changes  Made in treatment plan   Recommended Treatment: Continue with group therapy, milieu therapy and occupational therapy  Risks, benefits and possible side effects of Medications:   Risks, benefits, and possible side effects of medications explained to patient and patient verbalizes understanding        Medications:   current meds:   Current Facility-Administered Medications   Medication Dose Route Frequency    acetaminophen (TYLENOL) tablet 650 mg  650 mg Oral Q6H PRN    aluminum-magnesium hydroxide-simethicone (MYLANTA) 200-200-20 mg/5 mL oral suspension 30 mL  30 mL Oral Q4H PRN    benztropine (COGENTIN) injection 1 mg  1 mg Intramuscular Q6H PRN    benztropine (COGENTIN) tablet 1 mg  1 mg Oral Q6H PRN    divalproex sodium (DEPAKOTE ER) 24 hr tablet 500 mg  500 mg Oral HS    gabapentin (NEURONTIN) capsule 400 mg  400 mg Oral TID    haloperidol (HALDOL) tablet 5 mg  5 mg Oral Q8H PRN    haloperidol lactate Done     (HALDOL) injection 5 mg  5 mg Intramuscular Q6H PRN    hydrOXYzine HCL (ATARAX) tablet 25 mg  25 mg Oral Q6H PRN    ibuprofen (MOTRIN) tablet 600 mg  600 mg Oral Q8H PRN    LORazepam (ATIVAN) 2 mg/mL injection 2 mg  2 mg Intramuscular Q6H PRN    LORazepam (ATIVAN) tablet 2 mg  2 mg Oral Q8H PRN    magnesium hydroxide (MILK OF MAGNESIA) 400 mg/5 mL oral suspension 30 mL  30 mL Oral Daily PRN    multivitamin-minerals (CENTRUM) tablet 1 tablet  1 tablet Oral Daily    nicotine polacrilex (NICORETTE) gum 2 mg  2 mg Oral Q2H PRN    OLANZapine (ZyPREXA) IM injection 10 mg  10 mg Intramuscular Q3H PRN    OLANZapine (ZyPREXA) tablet 10 mg  10 mg Oral Q3H PRN    risperiDONE (RisperDAL M-TABS) dispersible tablet 1 mg  1 mg Oral Q4H PRN    risperiDONE (RisperDAL) tablet 0 5 mg  0 5 mg Oral BID    traMADol (ULTRAM) tablet 50 mg  50 mg Oral Q6H PRN    zolpidem (AMBIEN) tablet 5 mg  5 mg Oral HS PRN     Labs: NA    Assessment, Diagnosis  and Plan: continue with current meds and goals, F/U tomorrow    Counseling / Coordination of Care  Total floor / unit time spent today20 minutes  minutes  Greater than 50% of total time was spent with the patient and / or family counseling and / or coordination of care  A description of the counseling / coordination of care: , start risperidone 0 5 mg bid and Depakote  mg bed time, attending to order depakote level after  Confirming this patient  Has  been taking it      Tyler Triplett MD